# Patient Record
Sex: FEMALE | Race: WHITE | NOT HISPANIC OR LATINO | RURAL
[De-identification: names, ages, dates, MRNs, and addresses within clinical notes are randomized per-mention and may not be internally consistent; named-entity substitution may affect disease eponyms.]

---

## 2021-09-21 ENCOUNTER — OFFICE VISIT (OUTPATIENT)
Dept: RURAL CLINIC 8 | Facility: CLINIC | Age: 26
End: 2021-09-21

## 2021-09-21 RX ORDER — BUSPIRONE HYDROCHLORIDE 5 MG/1
1 TABLET TABLET ORAL TWICE A DAY
Status: ACTIVE | COMMUNITY

## 2021-09-21 RX ORDER — OMEPRAZOLE 40 MG/1
1 CAPSULE 30 MINUTES BEFORE MORNING MEAL CAPSULE, DELAYED RELEASE ORAL ONCE A DAY
Status: ACTIVE | COMMUNITY

## 2021-09-21 RX ORDER — VENLAFAXINE HYDROCHLORIDE 75 MG/1
1 CAPSULE WITH FOOD CAPSULE, EXTENDED RELEASE ORAL ONCE A DAY
Status: ACTIVE | COMMUNITY

## 2021-09-21 RX ORDER — SUCRALFATE 1 G
1 TABLET ON AN EMPTY STOMACH TABLET ORAL TWICE A DAY
Status: ACTIVE | COMMUNITY

## 2021-10-29 ENCOUNTER — OFFICE VISIT (OUTPATIENT)
Dept: RURAL CLINIC 8 | Facility: CLINIC | Age: 26
End: 2021-10-29

## 2022-03-23 ENCOUNTER — CLAIMS CREATED FROM THE CLAIM WINDOW (OUTPATIENT)
Dept: RURAL CLINIC 8 | Facility: CLINIC | Age: 27
End: 2022-03-23
Payer: MEDICARE

## 2022-03-23 ENCOUNTER — OFFICE VISIT (OUTPATIENT)
Dept: RURAL CLINIC 8 | Facility: CLINIC | Age: 27
End: 2022-03-23
Payer: MEDICARE

## 2022-03-23 ENCOUNTER — DASHBOARD ENCOUNTERS (OUTPATIENT)
Age: 27
End: 2022-03-23

## 2022-03-23 DIAGNOSIS — Z33.1 IUP (INTRAUTERINE PREGNANCY), INCIDENTAL: ICD-10-CM

## 2022-03-23 DIAGNOSIS — R10.84 GENERALIZED ABDOMINAL PAIN: ICD-10-CM

## 2022-03-23 DIAGNOSIS — A09 DIARRHEA OF INFECTIOUS ORIGIN: ICD-10-CM

## 2022-03-23 DIAGNOSIS — A49.8 RECURRENT CLOSTRIDIOIDES DIFFICILE INFECTION: ICD-10-CM

## 2022-03-23 PROCEDURE — 99204 OFFICE O/P NEW MOD 45 MIN: CPT | Performed by: INTERNAL MEDICINE

## 2022-03-23 RX ORDER — METHYLDOPA/HYDROCHLOROTHIAZIDE 250MG-15MG
AS DIRECTED TABLET ORAL
Status: ACTIVE | COMMUNITY

## 2022-03-23 RX ORDER — SUCRALFATE 1 G
1 TABLET ON AN EMPTY STOMACH TABLET ORAL TWICE A DAY
Status: DISCONTINUED | COMMUNITY

## 2022-03-23 RX ORDER — OMEPRAZOLE 40 MG/1
1 CAPSULE 30 MINUTES BEFORE MORNING MEAL CAPSULE, DELAYED RELEASE ORAL ONCE A DAY
Status: DISCONTINUED | COMMUNITY

## 2022-03-23 RX ORDER — BUSPIRONE HYDROCHLORIDE 5 MG/1
1 TABLET TABLET ORAL TWICE A DAY
Status: ACTIVE | COMMUNITY

## 2022-03-23 RX ORDER — VANCOMYCIN HYDROCHLORIDE 125 MG/1
AS DIRECTED CAPSULE ORAL
Status: ACTIVE | COMMUNITY

## 2022-03-23 RX ORDER — VENLAFAXINE HYDROCHLORIDE 75 MG/1
1 CAPSULE WITH FOOD CAPSULE, EXTENDED RELEASE ORAL ONCE A DAY
Status: DISCONTINUED | COMMUNITY

## 2022-03-23 NOTE — HPI-TODAY'S VISIT:
Patient comes in referral from Margot Milton.  A copy of the consultation will be sent to the referring provider.  Apparently this wound has had a being bad problems with diarrhea. - Pt says that in 2019 she was diagnosed with H pylori. "I had it twice". was having stomach problems and eventually was diagnosed with C diff. was having diarrhea, nausea, not being able to eat.  she has been treated with antibiotics for the third time. she has been on vancomycin twice and was also treated with flagyl. was apparently on a vancomycin taper and is currently on a vanc taper. - on a bad day, she could go up to 3-5. she had nocturnal stool.  one accident.  - she says that she gets pain on the RUQ and middle. "I just found out that I was pregnant Friday". she says that the diarrhea is better. she  has to go and sit to be able to have a stool.

## 2022-05-02 ENCOUNTER — TELEPHONE ENCOUNTER (OUTPATIENT)
Dept: URBAN - METROPOLITAN AREA CLINIC 92 | Facility: CLINIC | Age: 27
End: 2022-05-02